# Patient Record
(demographics unavailable — no encounter records)

---

## 2024-10-22 NOTE — HISTORY OF PRESENT ILLNESS
[FreeTextEntry1] : Patient following for LUTs on Terazosin to 10mg nightly. He has noted worsening of symptoms - more urgency, more frequency, more urge incontinence and waking up  at night - 2-3 times.. Now using CPAP machine.  FOS is OK but no consistent pattern, some good and some bad. He has  hesitancy if he holds too long. He had been on finasteride in the past but stopped due to side effects.  He is on daily Cialis for ED - very happy with result.  started on finasteride last year - no difference to his irritative symptoms. FOS may be a bit stronger.  notes increased urgency with a few episodes of urge incontinence - also post void dribbling. can feel no empty or feels more urine  there to void.  Else no changes. Last visit documented FR 15cc.  9/21 ; Still on daily Cialis, finasteride and terazosin. LUTs seem stable though has less urge incontinence and hold urge a bit longer. Has terminal dribbling. Still has frequency - nocturia 2 times. has bottle in car and decreased POs when driving. No dysuria or hematuria.  erections good with the Cialis.  4/22 still on the 3 drug cocktail: has more post void leaking and can have more urge incontinence. Nocturia also slight worse 1-3 times. FOS Ok unless he holds past the urge and can have hesitancy and intermittency. Has a bottle in car.  only voided 40cc for FR PVR 2.   4/23 on same 3 drug cocktail  - Has more episodes of urge to go with hesitancy & some intermittency  and if passes certain point urge incontinence. Nocturia the same as is the post void leakage.  Voided 170 Peak FR 17 - though pushed and PVR 40   10/24 - on Terazosin 10mg, daily tadalafil and finasteride Notes nocturia 2-4, slightly worse - can have hesitancy especially and nice with variable FOS. Has urgency and episodes of leaking. No dysuria or hematuria  voided 220 with peak FR 13 and PVR 75

## 2024-11-27 NOTE — REASON FOR VISIT
[Follow-Up] : a follow-up visit [TextBox_44] : moderate asthma, allergies, GERD, low vitamin D, ASHLY,r/o thoracic outlet syndrome

## 2024-11-27 NOTE — ASSESSMENT
[FreeTextEntry1] : Mr. Sánchez is a 71 year old male with a history of allergies, moderate persistent asthma, arthritis, BPH, elevated IgE level, GERD, IBS, low vitamin D, snoring, pharyngitis (active bronchitis and active asthma)- s/p URI - COVID-19 5/2022- s/p L TKR 8/2023; Family Stress / Poor Sleep / #1 issue sleep maintenance /#2 hearing/Meniere's Disease  The patient's SOB is felt to be multifactorial: -poor mechanics of breathing -mildly out of shape/ mildly overweight -Pulmonary  -Asthma  -allergies/sinus  Problem 1: Moderate Persistent Asthma (controlled) -s/p Prednisone 20 mg x 7 days, 10 mg x 7 days 5/2022 -Information sheet given to the patient to be reviewed, this medication is never to be used without consulting the prescribing physician. Proper dietary restraint is necessary specifically salt containing foods, if any reaction may occur should be reported. -continue Albuterol via nebulizer, Q6H (in office treatment given) - Wixela 250 1 puff BID or Breo Ellipta 200 at 1 inhalation QD  -continue Singulair 10 mg QHS -continue Ventolin 2 puffs Q6H, pre-exercise - Inhaler technique reviewed as well as oral hygiene technique reviewed with patient. Avoidance of cold air, extremes of temperature, rescue inhaler should be used before exercise. Order of medication reviewed with patient. Recommended use of a cool mist humidifier in the bedroom. - Asthma is believed to be caused by inherited (genetic) and environmental factor, but its exact cause is unknown. asthma may be triggered by allergens, lung infections, or irritants in the air. Asthma triggers are different for each person  Problem 1A: COVID-19 5/2022 (resolved) -recommended Paxlovid -recommended 8-10 day quarantine -recommended [facility] cold and flu  Problem 2: Allergies/sinus (quiet) -s/p Blood work to include: (+) asthma panel, (+) food IgE panel, (400+) IgE level, (203) eosinophil level, (low) vitamin D level -continue Olopatadine 0.6% 1 sniff BID -continue Clarinex 5 mg QAM -follow up with Dr. Garsia -robby Villarreal for hearing/ auditory evaluation -continue Xhance 1 sniff BID (recommended to review with Allergist/ ENT) -Environmental measures for allergies were encouraged including mattress and pillow cover, air purifier, and environmental controls.  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 Xolair is produced by a Chinese hamster ovary cell suspension culture in nutrient medium containing the antibiotic gentamicin. Gentamicin is not detectable in the final product. Xolair is a sterile, white, preservative free, lyophilized powder contained in a single use vial that is reconstituted with sterile water for suspension. Side effects include: wheezing, tightness of the chest, trouble breathing, hives, skin rash, feeling anxious or light-headed, fainting, warmth or tingling under skin, or swelling of face, lips, or tongue  Problem 4: Reflux/GERD- ?SIBO -recommended GI evaluation in SIBO -continue Omeprazole 40 mg QAM, pre-meal (weaning off) -Rule of 2s: avoid eating too much, eating too late, eating too spicy, eating two hours before bed. - Things to avoid including overeating, spicy foods, tight clothing, eating within two hours of bed, this list is not all inclusive. - For treatments of reflux, possible options discussed including diet control, H2 blockers, PPIs, as well as coating motility agents discussed as treatment options. Timing of meals and proximity of last meal to sleep were discussed. If symptoms persist, a formal gastrointestinal evaluation is needed.  Problem 5: Sleep apnea ([place]) - maintenance issue -Continue use of CPAP- replaced 4/2023 -repeat HSS for DD (June) -recommend OTC "Slumber Bump"/ "Somnifix" / "OxyAid" -recommended Neuromag / "gummies" -Sleep apnea is associated with adverse clinical consequences which can affect most organ systems. Cardiovascular disease risk includes arrhythmias, atrial fibrillation, hypertension, coronary artery disease, and stroke. Metabolic disorders include diabetes type 2, non-alcoholic fatty liver disease. Mood disorder especially depression; and cognitive decline especially in the elderly. Associations with chronic reflux/Britton's esophagus some but not all inclusive. -Reasons include arousal consistent with hypopnea; respiratory events most prominent in REM sleep or supine position; therefore sleep staging and body position are important for accurate diagnosis and estimation of AHI. - According to the National Heart, Lung and Blood [place], CPAP or continuous positive airway pressure is a treatment that uses mild air pressure to keep breathing airways open. A CPAP machine includes a mask or other device that fits over the nose or nose and mouth. The mask is connected to a machine via the tube through which humidified air is blown. In the cases of obstructive sleep apnea, CPAP can reverse the complete blockages or narrowing of upper airways. Follow diagnosis, CPAP machine pressures can be determined by a CPAP titration. Individuals who require CPAP can choose among masks and equipment that meet prescription and maximize comfort. Many become accustomed right away while others could require more time. Problems include uncomfortable masks or air leakage which can be adjusted to optimize compliance  Problem 6: Low vitamin D - Has been associated with asthma exacerbations and increased allergic symptoms. The goal based on recent information is maintaining levels between 50-70 and low normal is 30. Recommended 50,000 unites every two weeks to once a month depending on the level., BPV, ortho  Problem 7: mildly out of shape/ mildly overweight (BMI (26.7)>25) -recommended "10-Day Detox Diet" by Dr. Romeo Russ - Weight loss, exercise and diet control were discussed and are highly encouraged. Treatment options were given such as aqua therapy, and contacting a nutritionist. Recommended to use the elliptical, stationary bike, less use of treadmill. Mindful eating was explained to the patient. Obesity is associated with worsening asthma, SOB, and potential for cardiac disease, diabetes, and other underlying medical conditions.  Problem 7A: r/o Thoracic Outlet Syndrome/ shoulder -recommended Topricin cream -complete dedicated CT if needed -recommended evaluation by Dr. Mau Ferrari -recommended use of Game ready ice machine for shoulder  Problem 7B: Poor mechanics of breathing -Recommend Narda for breathing techniques - Proper breathing techniques were reviewed with an emphasis on exhalation. Patient instructed to breath in for 1 second and out for four seconds. Patient was encouraged not to talk while walking.  Problem 8: Health Maintenance -Urology: consider Detrol -recommended RSV vaccine in the Fall for anyone over the age of 60 -s/p Covid 19 moderna vaccine x4 -discussed and recommended Covid 19 precaution, vaccine, and booster at length. -s/p flu shot 2024 -recommended strep pneumonia vaccines: Prevnar-20 vaccine, follow by Pneumo vaccine 23 one year following (completed) -recommended early intervention for URIs -recommended regular osteoporosis evaluations -recommended early dermatological evaluations -recommended after the age of 50 to the age of 70, colonoscopy every 5 years  F/P in 6 months pt is encouraged to call or fax the office with any questions or concerns.

## 2024-11-27 NOTE — ADDENDUM
[FreeTextEntry1] : Documented by Gabby Watson acting as a scribe for Dr. Gus Ruiz on 11/27/2024. All medical record entries made by the Scribe were at my, Dr. Gus Ruiz's, direction and personally dictated by me on 11/27/2024. I have reviewed the chart and agree that the record accurately reflects my personal performance of the history, physical exam, assessment and plan. I have also personally directed, reviewed, and agree with the discharge instructions.

## 2024-11-27 NOTE — PROCEDURE
[FreeTextEntry1] : Full PFT reveals mild obstructive dysfunction at mid-low lung volumes; FEV1 was 2.71L which is 93% of predicted; normal lung volumes; normal diffusion at 21.60, which is 87% of predicted; normal flow volume loop. PFTs were performed to evaluate for SOB  FENO was 65; a normal value being less than 25 Fractional exhaled nitric oxide (FENO) is regarded as a simple, noninvasive method for assessing eosinophilic airway inflammation. Produced by a variety of cells within the lung, nitric oxide (NO) concentrations are generally low in healthy individuals. However, high concentrations of NO appear to be involved in nonspecific host defense mechanisms and chronic inflammatory diseases such as asthma. The American Thoracic Society (ATS) therefore has recommended using FENO to aid in the diagnosis and monitoring of eosinophilic airway inflammation and asthma, and for identifying steroid responsive individuals whose chronic respiratory symptoms may be caused by airway inflammation.

## 2024-11-27 NOTE — HISTORY OF PRESENT ILLNESS
[FreeTextEntry1] : Mr. Sánchez is a 71 year old male with a history of allergies, asthma, arthritis, BPH, elevated IgE level, GERD, IBS, low vitamin D, snoring, present in the office today for a follow-up visit for pulmonary evaluation. His chief complaint is  -he notes feeling generally well  -he notes intermittent SOB -he notes he uses Breo in the morning -he notes bowels are regular  -he notes minor episodes of dysphagia -he notes s/p ENT evaluation. He went on Omeprazole for 30 days, and the dysphagia resolved -he notes PNDrip and mild dysphonia at this time -he notes exercising 4-5 days per week, and he's in physical therapy -he notes he's lost inches off his waist -he notes he's in the best physical shape he's been in now that he no longer has knee pain -he notes R foot pain, for which he saw a neurologist -he notes he's wearing orthotics -he notes s/p toe surgery for a bone spur. He has arthritis in the joint, and needs injections -he notes his biggest issue is Meniere's disease -he notes frequent nocturia despite drinking 3+ hours before bed -he notes s/p urology evaluation, and he will likely need intervention as per Dr. Lloyd Rubin. He's on 3 medications for his prostate (Finasteride, Tadalafil, Terazosin) -he notes he uses his CPAP most nights -he notes s/p yearly flu shot 2024 -he notes he'll be in [place] until 4/2025 -he notes he has hemorrhoids, and he needs surgery  -he denies any headaches, nausea, emesis, fever, chills, sweats, chest pain, chest pressure, coughing, wheezing, palpitations, diarrhea, constipation, vertigo, myalgias, leg swelling, itchy eyes, itchy ears, heartburn, or sour taste in the mouth.

## 2025-01-27 NOTE — HISTORY OF PRESENT ILLNESS
[FreeTextEntry1] : Mr. Sánchez is a 71 year old male with a history of allergies, asthma, arthritis, BPH, elevated IgE level, GERD, IBS, low vitamin D, snoring, present in the office today for a follow-up visit for pulmonary evaluation. His chief complaint is  -he notes he has influenza A and dealing with Colitis  -he notes he started wheezing on Friday -he notes being on [place]-Flu  -he notes cough is reproducible but has no color -he notes he has some chills  - he notes His bowels are normal.  -he notes GERD is controlled with Omeprazole -he notes his sinuses are clogged and using a Saline spray BID  -he notes his sense of smell and taste are normal -he notes he increased his hydration    -Patient denies any headaches, nausea, vomiting, fever, chills, sweats, chest pain, chest pressure, palpitations, coughing, wheezing, fatigue, diarrhea, constipation, dysphagia, arthralgias, myalgias, dizziness, leg swelling, leg pain, itchy eyes, itchy ears, dysphonia, heartburn, reflux or sour taste in mouth.

## 2025-01-27 NOTE — ADDENDUM
[FreeTextEntry1] :  Documented by Jose F Ayala acting as a scribe for Dr. Gus Ruiz on 01/27/2025 .   All medical record entries made by the Scribe were at my, Dr. Gus Ruiz's direction and personally dictated by me on 01/27/2025 . I have reviewed the chart and agree that the record accurately reflects my personal performance of the history, Physical exam, assessment, and plan. I have also personally directed, reviewed, and agree with the discharge instructions.

## 2025-01-27 NOTE — PHYSICAL EXAM
[No Acute Distress] : no acute distress [Normal Oropharynx] : normal oropharynx [III] : Mallampati Class: III [Normal Appearance] : normal appearance [No Neck Mass] : no neck mass [Normal Rate/Rhythm] : normal rate/rhythm [Normal S1, S2] : normal s1, s2 [No Murmurs] : no murmurs [No Resp Distress] : no resp distress [No Abnormalities] : no abnormalities [Benign] : benign [Normal Gait] : normal gait [No Clubbing] : no clubbing [No Cyanosis] : no cyanosis [No Edema] : no edema [FROM] : FROM [Normal Color/ Pigmentation] : normal color/ pigmentation [No Focal Deficits] : no focal deficits [Oriented x3] : oriented x3 [Normal Affect] : normal affect [TextBox_11] : trauma to the right eye  [TextBox_68] : I:E ratio 1:3; mild expiratory wheeze

## 2025-01-27 NOTE — ASSESSMENT
[FreeTextEntry1] : Mr. Sánchez is a 71 year old male with a history of allergies, moderate persistent asthma, arthritis, BPH, elevated IgE level, GERD, IBS, low vitamin D, snoring, pharyngitis (active bronchitis and active asthma)- s/p URI - COVID-19 5/2022- s/p L TKR 8/2023; Family Stress / Poor Sleep / #1 issue is influenza A induced asthma  The patient's SOB is felt to be multifactorial: -poor mechanics of breathing -mildly out of shape/ mildly overweight -Pulmonary  -Asthma (active s/p Influenza)   -allergies/sinus  Problem 1: Moderate Persistent Asthma (Influenza A-1/2025) -s/p Prednisone 20 mg x 7 days, 10 mg x 7 days 5/2022, 1/2025 -Information sheet given to the patient to be reviewed, this medication is never to be used without consulting the prescribing physician. Proper dietary restraint is necessary specifically salt containing foods, if any reaction may occur should be reported. -continue Albuterol via nebulizer, Q6H (in office treatment given) - Wixela 250 1 puff BID or Breo Ellipta 200 at 1 inhalation QD  -continue Singulair 10 mg QHS -continue Ventolin 2 puffs Q6H, pre-exercise - Inhaler technique reviewed as well as oral hygiene technique reviewed with patient. Avoidance of cold air, extremes of temperature, rescue inhaler should be used before exercise. Order of medication reviewed with patient. Recommended use of a cool mist humidifier in the bedroom. - Asthma is believed to be caused by inherited (genetic) and environmental factor, but its exact cause is unknown. asthma may be triggered by allergens, lung infections, or irritants in the air. Asthma triggers are different for each person  Problem 1A: Influenza A (1/2025) -s/p Tamiflu  Problem 1B: COVID-19 5/2022 (resolved) -recommended Paxlovid -recommended 8-10 day quarantine -recommended [facility] [employer] cold and flu  Problem 2: Allergies/sinus (quiet) -s/p Blood work to include: (+) asthma panel, (+) food IgE panel, (400+) IgE level, (203) eosinophil level, (low) vitamin D level -continue Olopatadine 0.6% 1 sniff BID -continue Clarinex 5 mg QAM -follow up with Dr. Garsia -recommended JORDY Villarreal for hearing/ auditory evaluation -continue Xhance 1 sniff BID (recommended to review with Allergist/ ENT) -Environmental measures for allergies were encouraged including mattress and pillow cover, air purifier, and environmental controls.  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 Xolair is produced by a Chinese hamster ovary cell suspension culture in nutrient medium containing the antibiotic gentamicin. Gentamicin is not detectable in the final product. Xolair is a sterile, white, preservative free, lyophilized powder contained in a single use vial that is reconstituted with sterile water for suspension. Side effects include: wheezing, tightness of the chest, trouble breathing, hives, skin rash, feeling anxious or light-headed, fainting, warmth or tingling under skin, or swelling of face, lips, or tongue  Problem 4: Reflux/GERD- ?SIBO -recommended GI evaluation in SIBO -continue Omeprazole 40 mg QAM, pre-meal (weaning off) -Rule of 2s: avoid eating too much, eating too late, eating too spicy, eating two hours before bed. - Things to avoid including overeating, spicy foods, tight clothing, eating within two hours of bed, this list is not all inclusive. - For treatments of reflux, possible options discussed including diet control, H2 blockers, PPIs, as well as coating motility agents discussed as treatment options. Timing of meals and proximity of last meal to sleep were discussed. If symptoms persist, a formal gastrointestinal evaluation is needed.  Problem 5: Sleep apnea ([place]) - maintenance issue -Continue use of CPAP- replaced 4/2023 -repeat HSS for DD (June) -recommend OTC "Slumber Bump"/ "Somnifix" / "OxyAid" -recommended Neuromag / "gummies" -Sleep apnea is associated with adverse clinical consequences which can affect most organ systems. Cardiovascular disease risk includes arrhythmias, atrial fibrillation, hypertension, coronary artery disease, and stroke. Metabolic disorders include diabetes type 2, non-alcoholic fatty liver disease. Mood disorder especially depression; and cognitive decline especially in the elderly. Associations with chronic reflux/Britton's esophagus some but not all inclusive. -Reasons include arousal consistent with hypopnea; respiratory events most prominent in REM sleep or supine position; therefore sleep staging and body position are important for accurate diagnosis and estimation of AHI. - According to the National Heart, Lung and Blood [place], CPAP or continuous positive airway pressure is a treatment that uses mild air pressure to keep breathing airways open. A CPAP machine includes a mask or other device that fits over the nose or nose and mouth. The mask is connected to a machine via the tube through which humidified air is blown. In the cases of obstructive sleep apnea, CPAP can reverse the complete blockages or narrowing of upper airways. Follow diagnosis, CPAP machine pressures can be determined by a CPAP titration. Individuals who require CPAP can choose among masks and equipment that meet prescription and maximize comfort. Many become accustomed right away while others could require more time. Problems include uncomfortable masks or air leakage which can be adjusted to optimize compliance  Problem 6: Low vitamin D - Has been associated with asthma exacerbations and increased allergic symptoms. The goal based on recent information is maintaining levels between 50-70 and low normal is 30. Recommended 50,000 unites every two weeks to once a month depending on the level., BPV, ortho  Problem 7: mildly out of shape/ mildly overweight (BMI (26.7)>25) -recommended "10-Day Detox Diet" by Dr. Romeo Russ - Weight loss, exercise and diet control were discussed and are highly encouraged. Treatment options were given such as aqua therapy, and contacting a nutritionist. Recommended to use the elliptical, stationary bike, less use of treadmill. Mindful eating was explained to the patient. Obesity is associated with worsening asthma, SOB, and potential for cardiac disease, diabetes, and other underlying medical conditions.  Problem 7A: r/o Thoracic Outlet Syndrome/ shoulder -recommended Topricin cream -complete dedicated CT if needed -recommended evaluation by Dr. Mau Ferrari -recommended use of Game ready ice machine for shoulder  Problem 7B: Poor mechanics of breathing -Recommend Narda for breathing techniques - Proper breathing techniques were reviewed with an emphasis on exhalation. Patient instructed to breath in for 1 second and out for four seconds. Patient was encouraged not to talk while walking.  Problem 8: Health Maintenance -Urology: consider Detrol -recommended RSV vaccine in the Fall for anyone over the age of 60 -s/p Covid 19 moderna vaccine x4 -discussed and recommended Covid 19 precaution, vaccine, and booster at length. -s/p flu shot 2024 -recommended strep pneumonia vaccines: Prevnar-20 vaccine, follow by Pneumo vaccine 23 one year following (completed) -recommended early intervention for URIs -recommended regular osteoporosis evaluations -recommended early dermatological evaluations -recommended after the age of 50 to the age of 70, colonoscopy every 5 years  F/P in 6 months pt is encouraged to call or fax the office with any questions or concerns.

## 2025-05-19 NOTE — ADDENDUM
[FreeTextEntry1] : Documented by Chavez Hammond acting as a scribe for Dr. Gus Ruiz on 05/19/2025. All medical record entries made by the Scribe were at my, Dr. Gus Ruiz's, direction and personally dictated by me on 05/19/2025. I have reviewed the chart and agree that the record accurately reflects my personal performance of the history, physical exam, assessment and plan. I have also personally directed, reviewed, and agree with the discharge instructions.

## 2025-05-19 NOTE — HISTORY OF PRESENT ILLNESS
[FreeTextEntry1] : Mr. Sánchez is a 71 year old male with a history of allergies, asthma, arthritis, BPH, elevated IgE level, GERD, IBS, low vitamin D, snoring, present in the office today for a follow-up visit for pulmonary evaluation. His chief complaint is  -he notes issues keeping his weight down -he notes exercising  -he denies drinking anymore alcohol  -he notes s/p fall due to vasovagal response and received 35 stitches -he notes his scar itches him -he notes having palpitations -he notes his main complaint is stress and weight  -he denies any headaches, nausea, emesis, fever, chills, sweats, chest pain, chest pressure, coughing, wheezing, diarrhea, constipation, dysphagia, vertigo, arthralgias, myalgias, leg swelling, itchy eyes, itchy ears, heartburn, reflux, or sour taste in the mouth.

## 2025-05-19 NOTE — ASSESSMENT
[FreeTextEntry1] : Mr. Sánchez is a 71 year old male with a history of allergies, moderate persistent asthma, arthritis, BPH, elevated IgE level, GERD, IBS, low vitamin D, snoring, pharyngitis (active bronchitis and active asthma)- s/p URI - COVID-19 5/2022- s/p L TKR 8/2023; Family Stress / Poor Sleep / s/p influenza A induced asthma- #1 issue is weight gain, #2 family health stress  The patient's SOB is felt to be multifactorial: -poor mechanics of breathing -mildly out of shape/ mildly overweight -Pulmonary  -Asthma (active s/p Influenza)   -allergies/sinus  Problem 1: Moderate Persistent Asthma (Influenza A-1/2025) -s/p Prednisone 20 mg x 7 days, 10 mg x 7 days 5/2022, 1/2025 -Information sheet given to the patient to be reviewed, this medication is never to be used without consulting the prescribing physician. Proper dietary restraint is necessary specifically salt containing foods, if any reaction may occur should be reported. -continue Albuterol via nebulizer, Q6H (in office treatment given) - Wixela 250 1 puff BID or Breo Ellipta 200 at 1 inhalation QD  -continue Singulair 10 mg QHS -continue Ventolin 2 puffs Q6H, pre-exercise - Inhaler technique reviewed as well as oral hygiene technique reviewed with patient. Avoidance of cold air, extremes of temperature, rescue inhaler should be used before exercise. Order of medication reviewed with patient. Recommended use of a cool mist humidifier in the bedroom. - Asthma is believed to be caused by inherited (genetic) and environmental factor, but its exact cause is unknown. asthma may be triggered by allergens, lung infections, or irritants in the air. Asthma triggers are different for each person  Problem 1A: Influenza A (1/2025) -s/p Tamiflu  Problem 1B: COVID-19 5/2022 (resolved) -recommended Paxlovid -recommended 8-10 day quarantine -recommended Tammy-Anita cold and flu  Problem 2: Allergies/sinus (quiet) -s/p Blood work to include: (+) asthma panel, (+) food IgE panel, (400+) IgE level, (203) eosinophil level, (low) vitamin D level -continue Olopatadine 0.6% 1 sniff BID -continue Clarinex 5 mg QAM -follow up with Dr. Garsia -recommended JORDY Villarreal for hearing/ auditory evaluation -continue Xhance 1 sniff BID (recommended to review with Allergist/ ENT) -continue Astelin 0.15% 1 sniff/nostril BID -Environmental measures for allergies were encouraged including mattress and pillow cover, air purifier, and environmental controls.  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 Xolair is produced by a Chinese hamster ovary cell suspension culture in nutrient medium containing the antibiotic gentamicin. Gentamicin is not detectable in the final product. Xolair is a sterile, white, preservative free, lyophilized powder contained in a single use vial that is reconstituted with sterile water for suspension. Side effects include: wheezing, tightness of the chest, trouble breathing, hives, skin rash, feeling anxious or light-headed, fainting, warmth or tingling under skin, or swelling of face, lips, or tongue  Problem 4: Reflux/GERD- ?SIBO -recommended GI evaluation in SIBO -continue Omeprazole 40 mg QAM, pre-meal (weaning off) -Rule of 2s: avoid eating too much, eating too late, eating too spicy, eating two hours before bed. - Things to avoid including overeating, spicy foods, tight clothing, eating within two hours of bed, this list is not all inclusive. - For treatments of reflux, possible options discussed including diet control, H2 blockers, PPIs, as well as coating motility agents discussed as treatment options. Timing of meals and proximity of last meal to sleep were discussed. If symptoms persist, a formal gastrointestinal evaluation is needed.  Problem 5: Sleep apnea ([place]) - maintenance issue -Continue use of CPAP- replaced 4/2023 -repeat HSS for DD (June) -recommend OTC "Slumber Bump"/ "Somnifix" / "OxyAid" -recommended Neuromag / "gummies" -Sleep apnea is associated with adverse clinical consequences which can affect most organ systems. Cardiovascular disease risk includes arrhythmias, atrial fibrillation, hypertension, coronary artery disease, and stroke. Metabolic disorders include diabetes type 2, non-alcoholic fatty liver disease. Mood disorder especially depression; and cognitive decline especially in the elderly. Associations with chronic reflux/Britton's esophagus some but not all inclusive. -Reasons include arousal consistent with hypopnea; respiratory events most prominent in REM sleep or supine position; therefore sleep staging and body position are important for accurate diagnosis and estimation of AHI. - According to the National Heart, [employer] and [employer] [place], CPAP or continuous positive airway pressure is a treatment that uses mild air pressure to keep breathing airways open. A CPAP machine includes a mask or other device that fits over the nose or nose and mouth. The mask is connected to a machine via the tube through which humidified air is blown. In the cases of obstructive sleep apnea, CPAP can reverse the complete blockages or narrowing of upper airways. Follow diagnosis, CPAP machine pressures can be determined by a CPAP titration. Individuals who require CPAP can choose among masks and equipment that meet prescription and maximize comfort. Many become accustomed right away while others could require more time. Problems include uncomfortable masks or air leakage which can be adjusted to optimize compliance  Problem 6: Low vitamin D - Has been associated with asthma exacerbations and increased allergic symptoms. The goal based on recent information is maintaining levels between 50-70 and low normal is 30. Recommended 50,000 unites every two weeks to once a month depending on the level., BPV, ortho  Problem 7: mildly out of shape/ mildly overweight (BMI (26.7)>25) -recommended "10-Day Detox Diet" by Dr. Romeo Russ - Weight loss, exercise and diet control were discussed and are highly encouraged. Treatment options were given such as aqua therapy, and contacting a nutritionist. Recommended to use the elliptical, stationary bike, less use of treadmill. Mindful eating was explained to the patient. Obesity is associated with worsening asthma, SOB, and potential for cardiac disease, diabetes, and other underlying medical conditions.  Problem 7A: r/o Thoracic Outlet Syndrome/ shoulder -recommended Topricin cream -complete dedicated CT if needed -recommended evaluation by Dr. Mau Ferrari -recommended use of Game ready ice machine for shoulder  Problem 7B: Poor mechanics of breathing -Recommend Ferny and Edinson for breathing techniques - Proper breathing techniques were reviewed with an emphasis on exhalation. Patient instructed to breath in for 1 second and out for four seconds. Patient was encouraged not to talk while walking.  Problem 8: Health Maintenance -Urology: consider Detrol -recommended RSV vaccine in the Fall for anyone over the age of 60 -s/p Covid 19 moderna vaccine x4 -discussed and recommended Covid 19 precaution, vaccine, and booster at length. -s/p flu shot 2024 -recommended strep pneumonia vaccines: Prevnar-20 vaccine, follow by Pneumo vaccine 23 one year following (completed) -recommended early intervention for URIs -recommended regular osteoporosis evaluations -recommended early dermatological evaluations -recommended after the age of 50 to the age of 70, colonoscopy every 5 years  F/P in 6 months pt is encouraged to call or fax the office with any questions or concerns.

## 2025-05-19 NOTE — PROCEDURE
[FreeTextEntry1] : PFTs revealed normal flows; FEV1 was 2.04 L, which is 88.8% of predicted; normal flow volume loop.   PFTs were performed to evaluate for SOB  FENO was 66; a normal value being less than 25 Fractional exhaled nitric oxide (FENO) is regarded as a simple, noninvasive method for assessing eosinophilic airway inflammation. Produced by a variety of cells within the lung, nitric oxide (NO) concentrations are generally low in healthy individuals. However, high concentrations of NO appear to be involved in nonspecific host defense mechanisms and chronic inflammatory diseases such as asthma. The American Thoracic Society (ATS) therefore has recommended using FENO to aid in the diagnosis and monitoring of eosinophilic airway inflammation and asthma, and for identifying steroid responsive individuals whose chronic respiratory symptoms may be caused by airway inflammation.

## 2025-05-28 NOTE — HISTORY OF PRESENT ILLNESS
[de-identified] : Patient feels hearing worse - has b/l Hearing Aids - also has PND - using xhance,

## 2025-05-28 NOTE — DATA REVIEWED
[de-identified] : RE: Hearing WNL through 2 KHz, sloping to a mild to moderately severe SNHL. LE: Mild to profound SNHL. Type Ad MonishapRiri.

## 2025-06-10 NOTE — ASSESSMENT
[FreeTextEntry1] : getting more frustrated with symptoms noted his FR has been reducing  discussed TURP in depth - outcomes, recovery and complications   will split dose terazosin and get pelvic ULS to resize prostate

## 2025-06-10 NOTE — HISTORY OF PRESENT ILLNESS
[FreeTextEntry1] : Patient following for LUTs on Terazosin to 10mg nightly. He has noted worsening of symptoms - more urgency, more frequency, more urge incontinence and waking up  at night - 2-3 times.. Now using CPAP machine.  FOS is OK but no consistent pattern, some good and some bad. He has  hesitancy if he holds too long. He had been on finasteride in the past but stopped due to side effects.  He is on daily Cialis for ED - very happy with result.  started on finasteride last year - no difference to his irritative symptoms. FOS may be a bit stronger.  notes increased urgency with a few episodes of urge incontinence - also post void dribbling. can feel no empty or feels more urine  there to void.  Else no changes. Last visit documented FR 15cc.  9/21 ; Still on daily Cialis, finasteride and terazosin. LUTs seem stable though has less urge incontinence and hold urge a bit longer. Has terminal dribbling. Still has frequency - nocturia 2 times. has bottle in car and decreased POs when driving. No dysuria or hematuria.  erections good with the Cialis.  4/22 still on the 3 drug cocktail: has more post void leaking and can have more urge incontinence. Nocturia also slight worse 1-3 times. FOS Ok unless he holds past the urge and can have hesitancy and intermittency. Has a bottle in car.  only voided 40cc for FR PVR 2.   4/23 on same 3 drug cocktail  - Has more episodes of urge to go with hesitancy & some intermittency  and if passes certain point urge incontinence. Nocturia the same as is the post void leakage.  Voided 170 Peak FR 17 - though pushed and PVR 40   10/24 - on Terazosin 10mg, daily tadalafil and finasteride Notes nocturia 2-4, slightly worse - can have hesitancy especially and nice with variable FOS. Has urgency and episodes of leaking. No dysuria or hematuria  voided 220 with peak FR 13 and PVR 75   6/25 on triple therapy notes more frequency urgency and nocturia - 1-3; having more urge incontinence. needs to know where the bathroom.  if holds too long has trouble voiding. also post void dribbling.  can get light headed from time to time  last imaging noted prostate 100cc.   voided 122 peak FR 9/sec PVR 4cc